# Patient Record
Sex: MALE | Race: ASIAN | URBAN - METROPOLITAN AREA
[De-identification: names, ages, dates, MRNs, and addresses within clinical notes are randomized per-mention and may not be internally consistent; named-entity substitution may affect disease eponyms.]

---

## 2018-03-15 ENCOUNTER — OFFICE VISIT (OUTPATIENT)
Dept: FAMILY MEDICINE CLINIC | Age: 33
End: 2018-03-15

## 2018-03-15 VITALS
HEIGHT: 67 IN | TEMPERATURE: 99.5 F | RESPIRATION RATE: 16 BRPM | OXYGEN SATURATION: 97 % | SYSTOLIC BLOOD PRESSURE: 110 MMHG | HEART RATE: 83 BPM | WEIGHT: 156 LBS | BODY MASS INDEX: 24.48 KG/M2 | DIASTOLIC BLOOD PRESSURE: 70 MMHG

## 2018-03-15 DIAGNOSIS — M62.838 TRAPEZIUS MUSCLE SPASM: ICD-10-CM

## 2018-03-15 DIAGNOSIS — Z00.00 ROUTINE GENERAL MEDICAL EXAMINATION AT A HEALTH CARE FACILITY: Primary | ICD-10-CM

## 2018-03-15 PROBLEM — S46.811A STRAIN OF RIGHT TRAPEZIUS MUSCLE: Status: ACTIVE | Noted: 2018-03-15

## 2018-03-15 RX ORDER — CYCLOBENZAPRINE HCL 5 MG
5 TABLET ORAL
Qty: 15 TAB | Refills: 0 | Status: SHIPPED | OUTPATIENT
Start: 2018-03-15 | End: 2018-10-16 | Stop reason: ALTCHOICE

## 2018-03-15 RX ORDER — DICLOFENAC SODIUM 100 MG/1
100 TABLET, FILM COATED, EXTENDED RELEASE ORAL
Qty: 30 TAB | Refills: 0 | Status: SHIPPED | OUTPATIENT
Start: 2018-03-15 | End: 2018-10-16 | Stop reason: ALTCHOICE

## 2018-03-15 NOTE — PROGRESS NOTES
Chief Complaint   Patient presents with    Complete Physical     New Patient.       Neck Pain     pain radiates to upper back and upper base of head     Other     planning for conceive

## 2018-03-15 NOTE — PROGRESS NOTES
HISTORY OF PRESENT ILLNESS  Haim Gipson is a 29 y.o. male. He is a new patient and is here to establish care. Has recently moved from [facility]. .  The following sections were reviewed & updated as appropriate: PMH, PL, PSH, and SH. Had complete wellness completed at [facility] in 12/2017    HPI  Health Maintenance  Immunizations:    Influenza: up to date. Tetanus: unknown, record requested. Gardasil: n/a. Cancer screening:    Reviewed testicular, prostate, and colon cancer screening guidelines. Neck Pain  Patient complains of neck pain. Onset of symptoms was several months ago, unchanged since that time. Current symptoms are pain in right side neck (aching, throbbing, tight band in character; 5/10 in severity), stiffness in neck and upper back, denies weakness, denies numbness, denies paresthesias. Patient denies numbness, tingling, paresthesias in upper extremities. Patient denies weakness, diminished  strength, lack of coordination. Radiation of pain: to scalp and upper back Event that precipitate these symptoms: longstanding problem which has been getting worse, he used to ride bike and carry big back pack. Patient has had recurrent self limited episodes of neck pain in the past.  Previous treatments include: chiropractor/manipulation, ortho consult and cervical belt      Patient Care Team:  Harlan Nicolas MD as PCP - General (Family Practice)       The following sections were reviewed & updated as appropriate: PMH, PSH, FH, and SH. Patient Active Problem List   Diagnosis Code    Routine general medical examination at a health care facility Z00.00    Trapezius muscle spasm M62.838          Prior to Admission medications    Medication Sig Start Date End Date Taking? Authorizing Provider   cyclobenzaprine (FLEXERIL) 5 mg tablet Take 1 Tab by mouth nightly. 3/15/18  Yes Harlan Nicolas MD   diclofenac (VOLTAREN XR) 100 mg ER tablet Take 1 Tab by mouth nightly.  3/15/18  Yes Harlan Nicolas MD          No Known Allergies     Review of Systems   Constitutional: Negative for chills, fever and malaise/fatigue. HENT: Negative for congestion, ear pain, sore throat and tinnitus. Eyes: Negative for blurred vision, double vision, pain and discharge. Respiratory: Negative for cough, shortness of breath and wheezing. Cardiovascular: Negative for chest pain, palpitations and leg swelling. Gastrointestinal: Negative for abdominal pain, blood in stool, constipation, diarrhea, nausea and vomiting. Genitourinary: Negative for dysuria, frequency, hematuria and urgency. Musculoskeletal: Positive for neck pain. Negative for back pain, joint pain and myalgias. Skin: Negative for rash. Neurological: Negative for dizziness, tremors, seizures and headaches. Endo/Heme/Allergies: Negative for polydipsia. Does not bruise/bleed easily. Psychiatric/Behavioral: Negative for depression and substance abuse. The patient is not nervous/anxious. Physical Exam   Constitutional: He is oriented to person, place, and time. He appears well-developed and well-nourished. HENT:   Head: Normocephalic and atraumatic. Right Ear: External ear normal.   Left Ear: External ear normal.   Nose: Nose normal.   Mouth/Throat: Oropharynx is clear and moist. No oropharyngeal exudate. Eyes: Conjunctivae and EOM are normal. Pupils are equal, round, and reactive to light. Neck: Trachea normal and normal range of motion. Neck supple. Muscular tenderness present. No spinous process tenderness present. No thyromegaly present. Cardiovascular: Normal rate, regular rhythm, normal heart sounds and intact distal pulses. No murmur heard. Pulmonary/Chest: Effort normal and breath sounds normal. No respiratory distress. He has no wheezes. Abdominal: Soft. Bowel sounds are normal. He exhibits no distension and no mass. Genitourinary: Testes normal and penis normal. Right testis shows no mass.  Left testis shows no mass.   Musculoskeletal: Normal range of motion. He exhibits no edema or tenderness. Neurological: He is alert and oriented to person, place, and time. He has normal strength and normal reflexes. No sensory deficit. Cranial nerves 2-12 normal   Skin: Skin is warm and dry. No rash noted. Psychiatric: He has a normal mood and affect. His behavior is normal. Thought content normal.   Nursing note and vitals reviewed. ASSESSMENT and PLAN  Diagnoses and all orders for this visit:    1. Routine general medical examination at a health care facility  -     CBC WITH AUTOMATED DIFF  -     METABOLIC PANEL, COMPREHENSIVE  -     TSH REFLEX TO T4  -     URINALYSIS W/ RFLX MICROSCOPIC    2. Trapezius muscle spasm  -     cyclobenzaprine (FLEXERIL) 5 mg tablet; Take 1 Tab by mouth nightly. -     diclofenac (VOLTAREN XR) 100 mg ER tablet; Take 1 Tab by mouth nightly. discussed home exercise, muscle relaxer and as needed NSAID. Trigger injection if no improvement  Discussed lifestyle issues and health guidance given  Patient was given an after visit summary which includes diagnoses, vital signs, current medications, instructions and references & authorized prescriptions . Results of labs will be conveyed to patient, once available. Pt verbalized instructions I provided and expressed understanding of discussion that was held today. Follow-up Disposition:  Return if symptoms worsen or fail to improve.

## 2018-03-15 NOTE — MR AVS SNAPSHOT
[address] [place] [address] 
[phone] Patient: Cristiana Faith 
MRN: [id number] MSR:[id number]/[id number] Visit Information Date & Time Provider Department Dept. Phone Encounter #  
 3/15/2018 11:00 AM Jessika Perez, [address] [place] [address] [phone] [id number] Follow-up Instructions Return if symptoms worsen or fail to improve. Upcoming Health Maintenance Date Due DTaP/Tdap/Td series (1 - Tdap) 9/11/2008 Allergies as of 3/15/2018  Review Complete On: 3/15/2018 By: Jessika Perez MD  
 No Known Allergies Current Immunizations  Never Reviewed No immunizations on file. Not reviewed this visit You Were Diagnosed With   
  
 Codes Comments Routine general medical examination at a health care facility    -  Primary ICD-10-CM: Z00.00 ICD-9-CM: V70.0 Trapezius muscle spasm     ICD-10-CM: J61.886 ICD-9-CM: 728.85 Vitals BP Pulse Temp Resp Height(growth percentile) Weight(growth percentile) 110/70 (BP 1 Location: Right arm, BP Patient Position: Sitting) 83 99.5 °F (37.5 °C) (Oral) 16 5' 7.32\" (1.71 m) 156 lb (70.8 kg) SpO2 BMI Smoking Status 97% 24.2 kg/m2 Never Smoker Vitals History BMI and BSA Data Body Mass Index Body Surface Area  
 24.2 kg/m 2 1.83 m 2 Preferred Pharmacy Pharmacy Name Phone [id number] [place], [place] - [address] [phone] Your Updated Medication List  
  
   
This list is accurate as of 3/15/18 12:02 PM.  Always use your most recent med list.  
  
  
  
  
 cyclobenzaprine 5 mg tablet Commonly known as:  FLEXERIL Take 1 Tab by mouth nightly. diclofenac 100 mg ER tablet Commonly known as:  VOLTAREN XR Take 1 Tab by mouth nightly. Prescriptions Sent to Pharmacy  Refills  
 cyclobenzaprine (FLEXERIL) 5 mg tablet 0  
 Sig: Take 1 Tab by mouth nightly. Class: Normal  
 [facility] [phone] Route: Oral  
 diclofenac (VOLTAREN XR) 100 mg ER tablet 0 Sig: Take 1 Tab by mouth nightly. Class: Normal  
 [facility]: [phone] Route: Oral  
  
We Performed the Following CBC WITH AUTOMATED DIFF [87957 CPT(R)] METABOLIC PANEL, COMPREHENSIVE [81801 CPT(R)] TSH REFLEX TO T4 [12357 CPT(R)] URINALYSIS W/ RFLX MICROSCOPIC [74808 CPT(R)] Follow-up Instructions Return if symptoms worsen or fail to improve. Patient Instructions Neck Spasm: Exercises Your Care Instructions Here are some examples of typical rehabilitation exercises for your condition. Start each exercise slowly. Ease off the exercise if you start to have pain. Your doctor or physical therapist will tell you when you can start these exercises and which ones will work best for you. How to do the exercises Levator scapula stretch 1. Sit in a firm chair, or stand up straight. 2. Gently tilt your head toward your left shoulder. 3. Turn your head to look down into your armpit, bending your head slightly forward. Let the weight of your head stretch your neck muscles. 4. Hold for 15 to 30 seconds. 5. Return to your starting position. 6. Follow the same instructions above, but tilt your head toward your right shoulder. 7. Repeat 2 to 4 times toward each shoulder. Upper trapezius stretch 1. Sit in a firm chair, or stand up straight. 2. This stretch works best if you keep your shoulder down as you lean away from it. To help you remember to do this, start by relaxing your shoulders and lightly holding on to your thighs or your chair. 3. Tilt your head toward your shoulder and hold for 15 to 30 seconds.  Let the weight of your head stretch your muscles. 4. If you would like a little added stretch, place your arm behind your back. Use the arm opposite of the direction you are tilting your head. For example, if you are tilting your head to the left, place your right arm behind your back. 5. Repeat 2 to 4 times toward each shoulder. Neck rotation 1. Sit in a firm chair, or stand up straight. 2. Keeping your chin level, turn your head to the right, and hold for 15 to 30 seconds. 3. Turn your head to the left, and hold for 15 to 30 seconds. 4. Repeat 2 to 4 times to each side. Chin tuck 1. Lie on the floor with a rolled-up towel under your neck. Your head should be touching the floor. 2. Slowly bring your chin toward the front of your neck. 3. Hold for a count of 6, and then relax for up to 10 seconds. 4. Repeat 8 to 12 times. Forward neck flexion 1. Sit in a firm chair, or stand up straight. 2. Bend your head forward. 3. Hold for 15 to 30 seconds, then return to your starting position. 4. Repeat 2 to 4 times. Follow-up care is a key part of your treatment and safety. Be sure to make and go to all appointments, and call your doctor if you are having problems. It's also a good idea to know your test results and keep a list of the medicines you take. Where can you learn more? Go to http://abdon-jn.info/. Enter P962 in the search box to learn more about \"Neck Spasm: Exercises. \" Current as of: March 21, 2017 Content Version: 11.4 © 6094-0575 Healthwise, Incorporated. Care instructions adapted under license by [employer] (which disclaims liability or warranty for this information). If you have questions about a medical condition or this instruction, always ask your healthcare professional. [place] [address] any warranty or liability for your use of this information. Introducing [facility] SERVICES! Samara Lacey introduces [facility] patient portal. Now you can access parts of your medical record, email your doctor's office, and request medication refills online. 1. In your internet browser, go to [email or web address] 2. Click on the First Time User? Click Here link in the Sign In box. You will see the New Member Sign Up page. 3. Enter your [facility] Access Code exactly as it appears below. You will not need to use this code after youve completed the sign-up process. If you do not sign up before the expiration date, you must request a new code. · [facility] Access Code: [email or web address] Expires: 6/13/2018 12:02 PM 
 
4. Enter the last four digits of your Social Security Number (xxxx) and Date of Birth (mm/dd/yyyy) as indicated and click Submit. You will be taken to the next sign-up page. 5. Create a [facility] ID. This will be your [facility] login ID and cannot be changed, so think of one that is secure and easy to remember. 6. Create a [facility] password. You can change your password at any time. 7. Enter your Password Reset Question and Answer. This can be used at a later time if you forget your password. 8. Enter your e-mail address. You will receive e-mail notification when new information is available in [address] 9. Click Sign Up. You can now view and download portions of your medical record. 10. Click the Download Summary menu link to download a portable copy of your medical information. If you have questions, please visit the Frequently Asked Questions section of the [facility] website. Remember, [facility] is NOT to be used for urgent needs. For medical emergencies, dial 911. Now available from your iPhone and Android! Please provide this summary of care documentation to your next provider. Your primary care clinician is listed as Marcelo Sever. If you have any questions after today's visit, please call [phone].

## 2018-03-15 NOTE — PATIENT INSTRUCTIONS
Neck Spasm: Exercises  Your Care Instructions  Here are some examples of typical rehabilitation exercises for your condition. Start each exercise slowly. Ease off the exercise if you start to have pain. Your doctor or physical therapist will tell you when you can start these exercises and which ones will work best for you. How to do the exercises  Levator scapula stretch    1. Sit in a firm chair, or stand up straight. 2. Gently tilt your head toward your left shoulder. 3. Turn your head to look down into your armpit, bending your head slightly forward. Let the weight of your head stretch your neck muscles. 4. Hold for 15 to 30 seconds. 5. Return to your starting position. 6. Follow the same instructions above, but tilt your head toward your right shoulder. 7. Repeat 2 to 4 times toward each shoulder. Upper trapezius stretch    1. Sit in a firm chair, or stand up straight. 2. This stretch works best if you keep your shoulder down as you lean away from it. To help you remember to do this, start by relaxing your shoulders and lightly holding on to your thighs or your chair. 3. Tilt your head toward your shoulder and hold for 15 to 30 seconds. Let the weight of your head stretch your muscles. 4. If you would like a little added stretch, place your arm behind your back. Use the arm opposite of the direction you are tilting your head. For example, if you are tilting your head to the left, place your right arm behind your back. 5. Repeat 2 to 4 times toward each shoulder. Neck rotation    1. Sit in a firm chair, or stand up straight. 2. Keeping your chin level, turn your head to the right, and hold for 15 to 30 seconds. 3. Turn your head to the left, and hold for 15 to 30 seconds. 4. Repeat 2 to 4 times to each side. Chin tuck    1. Lie on the floor with a rolled-up towel under your neck. Your head should be touching the floor. 2. Slowly bring your chin toward the front of your neck.   3. Hold for a count of 6, and then relax for up to 10 seconds. 4. Repeat 8 to 12 times. Forward neck flexion    1. Sit in a firm chair, or stand up straight. 2. Bend your head forward. 3. Hold for 15 to 30 seconds, then return to your starting position. 4. Repeat 2 to 4 times. Follow-up care is a key part of your treatment and safety. Be sure to make and go to all appointments, and call your doctor if you are having problems. It's also a good idea to know your test results and keep a list of the medicines you take. Where can you learn more? Go to http://abdon-jn.info/. Enter P962 in the search box to learn more about \"Neck Spasm: Exercises. \"  Current as of: March 21, 2017  Content Version: 11.4  © 0263-7831 Healthwise, Incorporated. Care instructions adapted under license by [employer] (which disclaims liability or warranty for this information). If you have questions about a medical condition or this instruction, always ask your healthcare professional. [place] [address] any warranty or liability for your use of this information.

## 2018-03-16 LAB
ALBUMIN SERPL-MCNC: 4.6 G/DL (ref 3.5–5.5)
ALBUMIN/GLOB SERPL: 1.6 {RATIO} (ref 1.2–2.2)
ALP SERPL-CCNC: 73 IU/L (ref 39–117)
ALT SERPL-CCNC: 71 IU/L (ref 0–44)
APPEARANCE UR: CLEAR
AST SERPL-CCNC: 45 IU/L (ref 0–40)
BASOPHILS # BLD AUTO: 0.1 X10E3/UL (ref 0–0.2)
BASOPHILS NFR BLD AUTO: 1 %
BILIRUB SERPL-MCNC: 1.3 MG/DL (ref 0–1.2)
BILIRUB UR QL STRIP: NEGATIVE
BUN SERPL-MCNC: 11 MG/DL (ref 6–20)
BUN/CREAT SERPL: 9 (ref 9–20)
CALCIUM SERPL-MCNC: 9.7 MG/DL (ref 8.7–10.2)
CHLORIDE SERPL-SCNC: 100 MMOL/L (ref 96–106)
CO2 SERPL-SCNC: 24 MMOL/L (ref 18–29)
COLOR UR: YELLOW
CREAT SERPL-MCNC: 1.16 MG/DL (ref 0.76–1.27)
EOSINOPHIL # BLD AUTO: 0.5 X10E3/UL (ref 0–0.4)
EOSINOPHIL NFR BLD AUTO: 6 %
ERYTHROCYTE [DISTWIDTH] IN BLOOD BY AUTOMATED COUNT: 13.7 % (ref 12.3–15.4)
GFR SERPLBLD CREATININE-BSD FMLA CKD-EPI: 84 ML/MIN/1.73
GFR SERPLBLD CREATININE-BSD FMLA CKD-EPI: 97 ML/MIN/1.73
GLOBULIN SER CALC-MCNC: 2.8 G/DL (ref 1.5–4.5)
GLUCOSE SERPL-MCNC: 69 MG/DL (ref 65–99)
GLUCOSE UR QL: NEGATIVE
HCT VFR BLD AUTO: 50.3 % (ref 37.5–51)
HGB BLD-MCNC: 17 G/DL (ref 13–17.7)
HGB UR QL STRIP: NEGATIVE
IMM GRANULOCYTES # BLD: 0 X10E3/UL (ref 0–0.1)
IMM GRANULOCYTES NFR BLD: 0 %
KETONES UR QL STRIP: NEGATIVE
LEUKOCYTE ESTERASE UR QL STRIP: NEGATIVE
LYMPHOCYTES # BLD AUTO: 2.4 X10E3/UL (ref 0.7–3.1)
LYMPHOCYTES NFR BLD AUTO: 32 %
MCH RBC QN AUTO: 33.1 PG (ref 26.6–33)
MCHC RBC AUTO-ENTMCNC: 33.8 G/DL (ref 31.5–35.7)
MCV RBC AUTO: 98 FL (ref 79–97)
MICRO URNS: NORMAL
MONOCYTES # BLD AUTO: 0.8 X10E3/UL (ref 0.1–0.9)
MONOCYTES NFR BLD AUTO: 10 %
NEUTROPHILS # BLD AUTO: 3.9 X10E3/UL (ref 1.4–7)
NEUTROPHILS NFR BLD AUTO: 51 %
NITRITE UR QL STRIP: NEGATIVE
PH UR STRIP: 6.5 [PH] (ref 5–7.5)
PLATELET # BLD AUTO: 262 X10E3/UL (ref 150–379)
POTASSIUM SERPL-SCNC: 4.5 MMOL/L (ref 3.5–5.2)
PROT SERPL-MCNC: 7.4 G/DL (ref 6–8.5)
PROT UR QL STRIP: NEGATIVE
RBC # BLD AUTO: 5.13 X10E6/UL (ref 4.14–5.8)
SODIUM SERPL-SCNC: 142 MMOL/L (ref 134–144)
SP GR UR: 1.01 (ref 1–1.03)
TSH SERPL DL<=0.005 MIU/L-ACNC: 1.44 UIU/ML (ref 0.45–4.5)
UROBILINOGEN UR STRIP-MCNC: 0.2 MG/DL (ref 0.2–1)
WBC # BLD AUTO: 7.6 X10E3/UL (ref 3.4–10.8)

## 2018-03-16 NOTE — PROGRESS NOTES
Adarsh Antonio,  I have reviewed your results  Blood count again shows borderline elevated MCV and borderline elevated bilirubin and liver enzymes. Thyroid function normal  [facility] Can you please varify with them if they have checked for Hepatitis B and A also which is more common in [facility] . If not we will just get it checked. Send me message once you confirm.   thanks

## 2018-03-19 ENCOUNTER — PATIENT MESSAGE (OUTPATIENT)
Dept: FAMILY MEDICINE CLINIC | Age: 33
End: 2018-03-19

## 2018-03-19 DIAGNOSIS — R79.89 ABNORMAL LFTS: Primary | ICD-10-CM

## 2018-03-19 NOTE — TELEPHONE ENCOUNTER
From: Katya Mention  To: Felipe Burks MD  Sent: 3/19/2018 8:04 AM EDT  Subject: Visit Follow-Up Question    Hello Doctor,    Hepatitis A and B test were not done during the annual check up which I had in December at [facility]. I am ready to have this test done.      Thanks,  Todd Bowers

## 2018-03-20 LAB
EST. AVERAGE GLUCOSE BLD GHB EST-MCNC: 103 MG/DL
HAV IGM SERPL QL IA: NEGATIVE
HBA1C MFR BLD: 5.2 % (ref 4.8–5.6)
HBV SURFACE AB SER QL: REACTIVE

## 2018-03-20 NOTE — TELEPHONE ENCOUNTER
Adarsh Antonio,  Your hepatitis A and B results are also normal, reassuring. So likely elevation in bilirubin and liver enzymes is benign, not to worry.   thanks

## 2018-04-02 ENCOUNTER — PATIENT MESSAGE (OUTPATIENT)
Dept: FAMILY MEDICINE CLINIC | Age: 33
End: 2018-04-02

## 2018-04-02 DIAGNOSIS — M54.2 NECK PAIN: ICD-10-CM

## 2018-04-02 DIAGNOSIS — M62.838 TRAPEZIUS MUSCLE SPASM: Primary | ICD-10-CM

## 2018-04-18 NOTE — TELEPHONE ENCOUNTER
From: [facility]  To: oYni Coy MD  Sent: 4/2/2018 11:37 AM EDT  Subject: Prescription Question    Hello Doctor,    For the shoulder pain I was taking 2 medicine, one was completed last night and another still available for 10 days. Should I be continuing only 1 for that 10 days?     Regards  [place]

## 2018-10-16 ENCOUNTER — OFFICE VISIT (OUTPATIENT)
Dept: FAMILY MEDICINE CLINIC | Age: 33
End: 2018-10-16

## 2018-10-16 VITALS
BODY MASS INDEX: 26.21 KG/M2 | HEIGHT: 67 IN | DIASTOLIC BLOOD PRESSURE: 76 MMHG | HEART RATE: 77 BPM | WEIGHT: 167 LBS | SYSTOLIC BLOOD PRESSURE: 120 MMHG | OXYGEN SATURATION: 98 % | TEMPERATURE: 98.4 F | RESPIRATION RATE: 16 BRPM

## 2018-10-16 DIAGNOSIS — E66.3 OVER WEIGHT: ICD-10-CM

## 2018-10-16 DIAGNOSIS — Z23 ENCOUNTER FOR IMMUNIZATION: ICD-10-CM
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
## 2018-10-16 DIAGNOSIS — L28.1 PRURIGO NODULARIS: Primary | ICD-10-CM

## 2018-10-16 DIAGNOSIS — M62.838 TRAPEZIUS MUSCLE SPASM: ICD-10-CM

## 2018-10-16 RX ORDER — CLOBETASOL PROPIONATE 0.5 MG/G
CREAM TOPICAL 2 TIMES DAILY
Qty: 15 G | Refills: 0 | Status: SHIPPED | OUTPATIENT
Start: 2018-10-16

## 2018-10-16 NOTE — PROGRESS NOTES
HISTORY OF PRESENT ILLNESS Clarisse Auguste is a 34 y.o. male. he was seen for lesion on dorsum of right hand and also persistent neck spasm. HPI Lump: 
Clarisse Auguste is a 34 y.o. male who was seen today for evaluation of a  skin lesion located on right hand. The patient reports that the mass has been present for 2 months. The onset of the mass was gradual.  
Associated symptoms include itching. Patient denies pain, redness or discoloration of the skin, fatigue, weight loss, fevers. Prior treatments, if any: none He went for hiking about 2 months back. Had some insect bite and had severe itching. Slowly that area converted into nodule. Neck Pain Patient complains of neck pain. Onset of symptoms was several months ago, unchanged since that time. Current symptoms are pain in right side neck (aching, throbbing, tight band in character; 5/10 in severity), stiffness in neck and upper back, denies weakness, denies numbness, denies paresthesias. Patient denies numbness, tingling, paresthesias in upper extremities. Patient denies weakness, diminished  strength, lack of coordination. Radiation of pain: to scalp and upper back Event that precipitate these symptoms: longstanding problem which has been getting worse, he used to ride bike and carry big back pack. Patient has had recurrent self limited episodes of neck pain in the past.  Previous treatments include: chiropractor/manipulation, ortho consult and cervical belt, muscle relaxer and NSAID Review of Systems Constitutional: Negative for chills, fever and malaise/fatigue. HENT: Negative for congestion, ear pain, sore throat and tinnitus. Eyes: Negative for blurred vision, double vision, pain and discharge. Respiratory: Negative for cough, shortness of breath and wheezing. Cardiovascular: Negative for chest pain, palpitations and leg swelling.   
Gastrointestinal: Negative for abdominal pain, blood in stool, constipation, diarrhea, nausea and vomiting. Genitourinary: Negative for dysuria, frequency, hematuria and urgency. Musculoskeletal: Positive for neck pain. Negative for back pain, joint pain and myalgias. Skin: Negative for rash. Lesion on dorsum of right hand Neurological: Negative for dizziness, tremors, seizures and headaches. Endo/Heme/Allergies: Negative for polydipsia. Does not bruise/bleed easily. Psychiatric/Behavioral: Negative for depression and substance abuse. The patient is not nervous/anxious. Physical Exam  
Constitutional: He is oriented to person, place, and time. He appears well-developed and well-nourished. HENT:  
Head: Normocephalic and atraumatic. Right Ear: External ear normal.  
Left Ear: External ear normal.  
Nose: Nose normal.  
Mouth/Throat: Oropharynx is clear and moist. No oropharyngeal exudate. Eyes: Conjunctivae and EOM are normal. Pupils are equal, round, and reactive to light. Neck: Trachea normal and normal range of motion. Neck supple. Muscular tenderness present. No spinous process tenderness present. No thyromegaly present. Cardiovascular: Normal rate, regular rhythm, normal heart sounds and intact distal pulses. No murmur heard. Pulmonary/Chest: Effort normal and breath sounds normal. No respiratory distress. He has no wheezes. Abdominal: Soft. Bowel sounds are normal. He exhibits no distension and no mass. Genitourinary: Penis normal.  
Musculoskeletal: Normal range of motion. He exhibits no edema or tenderness. Hands: 
Neurological: He is alert and oriented to person, place, and time. He has normal strength and normal reflexes. No sensory deficit. Cranial nerves 2-12 normal  
Skin: Skin is warm and dry. No rash noted. Psychiatric: He has a normal mood and affect. His behavior is normal. Thought content normal.  
Nursing note and vitals reviewed. ASSESSMENT and PLAN Diagnoses and all orders for this visit: 
 
 1. Prurigo nodularis 
-     clobetasol (TEMOVATE) 0.05 % topical cream; Apply  to affected area two (2) times a day. 2. Trapezius muscle spasm 
-     REFERRAL TO PHYSICAL THERAPY 3. Encounter for immunization -     [place] IMMUNIZ ADMIN,1 SINGLE/COMB VAC/TOXOID 
-     Influenza virus vaccine (QUADRIVALENT PRES FREE SYRINGE) IM (23305) 4. Over weight Discussed abnormal weight, ideal BMI and importance of diet and exercise to loose weight. Referral for exercise program was offered. Printed information about diet and lifestyle modification provided. 5. Tick bite, initial encounter 
-     LYME AB TOTAL W/RFLX W BLOT Discussed lifestyle issues and health guidance given Patient was given an after visit summary which includes diagnoses, vital signs, current medications, instructions and references & authorized prescriptions . Results of labs will be conveyed to patient, once available. Pt verbalized instructions I provided and expressed understanding of discussion that was held today. Follow-up Disposition: Not on File

## 2018-10-16 NOTE — PROGRESS NOTES
Chief Complaint Patient presents with  
 Skin Problem  
  right hand, darken area that has been on hand for two months, tender to touch . originally red with itching  Immunization/Injection Influenza 1. Have you been to the ER, urgent care clinic since your last visit? Hospitalized since your last visit? No 
 
2. Have you seen or consulted any other health care providers outside of the [address] [place] [address] since your last visit? Include any pap smears or colon screening. Katherine Medina  is a 34 y.o.  male  who present for Influenza  immunizations/injections. He/she denies any symptoms , reactions or allergies that would exclude them from being immunized today. Risks and adverse reactions were discussed and the VIS was given if applicable to them. All questions were addressed. He/She was observed for 10 min post injection. There were no reactions observed.  
 
Staci Vega LPN

## 2018-10-16 NOTE — MR AVS SNAPSHOT
[address] 
[phone] Patient: Kelvin Arevalo 
MRN: [id number] XDS:0/52/3403 Visit Information Date & Time Provider Department Dept. Phone Encounter #  
 10/16/2018  1:40 PM Teresita Vergara, [address] [place] [address] [phone] [id number] Upcoming Health Maintenance Date Due DTaP/Tdap/Td series (1 - Tdap) 9/11/2008 Influenza Age 5 to Adult 8/1/2018 Allergies as of 10/16/2018  Review Complete On: 10/16/2018 By: Teresita Veragra MD  
 No Known Allergies Current Immunizations  Never Reviewed Name Date Influenza Vaccine (Quad) PF  Incomplete Not reviewed this visit You Were Diagnosed With   
  
 Codes Comments Prurigo nodularis    -  Primary ICD-10-CM: L28.1 ICD-9-CM: 698.3 Trapezius muscle spasm     ICD-10-CM: C38.067 ICD-9-CM: 728.85 Encounter for immunization     ICD-10-CM: Q11 ICD-9-CM: V03.89 Over weight     ICD-10-CM: B38.2 ICD-9-CM: 278.02 Tick bite, initial encounter     ICD-10-CM: W57. Carbon Fines ICD-9-CM: 919.4, E906.4 Vitals BP Pulse Temp Resp Height(growth percentile) Weight(growth percentile) 120/76 (BP 1 Location: Left arm, BP Patient Position: Sitting) 77 98.4 °F (36.9 °C) (Oral) 16 5' 7.32\" (1.71 m) 167 lb (75.8 kg) SpO2 BMI Smoking Status 98% 25.91 kg/m2 Never Smoker Vitals History BMI and BSA Data Body Mass Index Body Surface Area  
 25.91 kg/m 2 1.9 m 2 Preferred Pharmacy Pharmacy Name Phone [id number] [place], [place] - [address] [phone] Your Updated Medication List  
  
   
This list is accurate as of 10/16/18  2:17 PM.  Always use your most recent med list.  
  
  
  
  
 clobetasol 0.05 % topical cream  
Commonly known as:  Luis Pattersonenstein Apply  to affected area two (2) times a day. Prescriptions Sent to Pharmacy Refills  
 clobetasol (TEMOVATE) 0.05 % topical cream 0 Sig: Apply  to affected area two (2) times a day. Class: Normal  
 Pharmacy: [place], [address]  #: [phone] Route: Topical  
  
We Performed the Following INFLUENZA VIRUS VAC QUAD,SPLIT,PRESV FREE SYRINGE IM [id number] CPT(R)] LYME AB TOTAL W/RFLX W BLOT [[id number] Custom] UT IMMUNIZ ADMIN,1 SINGLE/COMB VAC/TOXOID [id number] CPT(R)] REFERRAL TO PHYSICAL THERAPY [RSW44 Custom] Comments:  
 Please evaluate patient for neck spasm. Referral Information Referral ID Referred By Referred To  
  
 [id number] Adrian Fabian Not Available Visits Status Start Date End Date 1 New Request 10/16/18 10/16/19 If your referral has a status of pending review or denied, additional information will be sent to support the outcome of this decision. Patient Instructions When You Are Overweight: Care Instructions Your Care Instructions If you're overweight, your doctor may recommend that you make changes in your eating and exercise habits. Being overweight can lead to serious health problems, such as high blood pressure, heart disease, type 2 diabetes, and arthritis, or it can make these problems worse. Eating a healthy diet and being more active can help you reach and stay at a healthy weight. You don't have to make huge changes all at once. Start by making small changes in your eating and exercise habits. To lose weight, you need to burn more calories than you take in. You can do this by eating healthy foods in reasonable amounts and becoming more active every day. Follow-up care is a key part of your treatment and safety. Be sure to make and go to all appointments, and call your doctor if you are having problems. It's also a good idea to know your test results and keep a list of the medicines you take. How can you care for yourself at home? · Improve your eating habits. You'll be more successful if you work on changing one eating habit at a time. All foods, if eaten in moderation, can be part of healthy eating. Remember to: 
[address] a variety of foods from each food group. Include grains, vegetables, fruits, dairy, and protein foods. ¨ Limit foods high in fat, sugar, and calories. ¨ Eat slowly. And don't do anything else, such as watch TV, while you are eating. ¨ Pay attention to portion sizes. Put your food on a smaller plate. ¨ Plan your meals ahead of time. You'll be less likely to grab something that's not as healthy. · Get active. Regular activity can help you feel better, have more energy, and burn more calories. If you haven't been active, start slowly. Start with at least 30 minutes of moderate activity on most days of the week. Then gradually increase the amount of activity. Try for 60 or 90 minutes a day, at least 5 days a week. There are a lot of ways to fit activity into your life. You can: 
¨ Walk or bike to the store. Or walk with a friend, or walk the dog. ¨ Mow the lawn, rake leaves, shovel snow, or do some gardening. ¨ Use the stairs instead of the elevator, at least for a few floors. · Change your thinking. Your thoughts have a lot to do with how you feel and what you do. When you're trying to reach a healthy weight, changing how you think about certain things may help. Here are some ideas: ¨ Don't compare yourself to others. Healthy bodies come in all shapes and sizes. ¨ Pay attention to how hungry or full you feel. When you eat, be aware of why you're eating and how much you're eating. ¨ Focus on improving your health instead of dieting. Dieting almost never works over the long term. · Ask your doctor about other health professionals who can help you reach a healthy weight. ¨ A dietitian can help you make healthy changes in your diet. ¨ An exercise specialist or  can help you develop a safe and effective exercise program. 
¨ A counselor or psychiatrist can help you cope with issues such as depression, anxiety, or family problems that can make it hard to focus on reaching a healthy weight. · Get support from your family, your doctor, your friends, a support groupand support yourself. Where can you learn more? Go to http://abdon-jn.info/. Enter S391 in the search box to learn more about \"When You Are Overweight: Care Instructions. \" Current as of: September 10, 2017 Content Version: 11.8 © 0034-0279 [employer]. Care instructions adapted under license by [employer] (which disclaims liability or warranty for this information). If you have questions about a medical condition or this instruction, always ask your healthcare professional. [address] any warranty or liability for your use of this information. Introducing [facility]! Dear Arlet Martinez: Thank you for requesting a [employer] account. Our records indicate that you already have an active [employer] account. You can access your account anytime at [email or web address] Did you know that you can access your hospital and ER discharge instructions at any time in [employer]? You can also review all of your test results from your hospital stay or ER visit. Additional Information If you have questions, please visit the Frequently Asked Questions section of the [employer] website at [email or web address] Remember, [employer] is NOT to be used for urgent needs. For medical emergencies, dial 911. Now available from your iPhone and Android! Please provide this summary of care documentation to your next provider. Lyme Disease Testing Disclaimer:   
 § 75.3-0842.2. (Expires July 1, 2018) Lyme disease testing information disclosure. A. Every licensee or his in-office designee who orders a laboratory test for the presence of Lyme disease shall provide to the patient or his legal representative the following written information: \"ACCORDING TO THE CENTERS FOR DISEASE CONTROL AND PREVENTION, AS OF 2011 LYME DISEASE IS THE SIXTH FASTEST GROWING DISEASE IN THE [place]. YOUR HEALTH CARE PROVIDER HAS ORDERED A LABORATORY TEST FOR THE PRESENCE OF LYME DISEASE FOR YOU. CURRENT LABORATORY TESTING FOR LYME DISEASE CAN BE PROBLEMATIC AND STANDARD LABORATORY TESTS OFTEN RESULT IN FALSE NEGATIVE AND FALSE POSITIVE RESULTS, AND IF DONE TOO EARLY, YOU MAY NOT HAVE PRODUCED ENOUGH ANTIBODIES TO BE CONSIDERED POSITIVE BECAUSE YOUR IMMUNE RESPONSE REQUIRES TIME TO DEVELOP ANTIBODIES. IF YOU ARE TESTED FOR LYME DISEASE, AND THE RESULTS ARE NEGATIVE, THIS DOES NOT NECESSARILY MEAN YOU DO NOT HAVE LYME DISEASE. IF YOU CONTINUE TO EXPERIENCE SYMPTOMS, YOU SHOULD CONTACT YOUR HEALTH CARE PROVIDER AND INQUIRE ABOUT THE APPROPRIATENESS OF RETESTING OR ADDITIONAL TREATMENT. \"  
B. Licensees shall be immune from civil liability for the provision of the written information required by this section absent gross negligence or willful misconduct. Your primary care clinician is listed as Shahab Galvin. If you have any questions after today's visit, please call [phone].

## 2018-10-16 NOTE — PATIENT INSTRUCTIONS
When You Are Overweight: Care Instructions Your Care Instructions If you're overweight, your doctor may recommend that you make changes in your eating and exercise habits. Being overweight can lead to serious health problems, such as high blood pressure, heart disease, type 2 diabetes, and arthritis, or it can make these problems worse. Eating a healthy diet and being more active can help you reach and stay at a healthy weight. You don't have to make huge changes all at once. Start by making small changes in your eating and exercise habits. To lose weight, you need to burn more calories than you take in. You can do this by eating healthy foods in reasonable amounts and becoming more active every day. Follow-up care is a key part of your treatment and safety. Be sure to make and go to all appointments, and call your doctor if you are having problems. It's also a good idea to know your test results and keep a list of the medicines you take. How can you care for yourself at home? · Improve your eating habits. You'll be more successful if you work on changing one eating habit at a time. All foods, if eaten in moderation, can be part of healthy eating. Remember to: 
[address] [facility] a variety of foods from each food group. Include grains, vegetables, fruits, dairy, and protein foods. ¨ Limit foods high in fat, sugar, and calories. ¨ Eat slowly. And don't do anything else, such as watch TV, while you are eating. ¨ Pay attention to portion sizes. Put your food on a smaller plate. ¨ Plan your meals ahead of time. You'll be less likely to grab something that's not as healthy. · Get active. Regular activity can help you feel better, have more energy, and burn more calories. If you haven't been active, start slowly. Start with at least 30 minutes of moderate activity on most days of the week. Then gradually increase the amount of activity.  Try for 60 or 90 minutes a day, at least 5 days a week. There are a lot of ways to fit activity into your life. You can: 
¨ Walk or bike to the store. Or walk with a friend, or walk the dog. ¨ Mow the lawn, rake leaves, shovel snow, or do some gardening. ¨ Use the stairs instead of the elevator, at least for a few floors. · Change your thinking. Your thoughts have a lot to do with how you feel and what you do. When you're trying to reach a healthy weight, changing how you think about certain things may help. Here are some ideas: ¨ Don't compare yourself to others. Healthy bodies come in all shapes and sizes. ¨ Pay attention to how hungry or full you feel. When you eat, be aware of why you're eating and how much you're eating. ¨ Focus on improving your health instead of dieting. Dieting almost never works over the long term. · Ask your doctor about other health professionals who can help you reach a healthy weight. ¨ A dietitian can help you make healthy changes in your diet. ¨ An exercise specialist or  can help you develop a safe and effective exercise program. 
¨ A counselor or psychiatrist can help you cope with issues such as depression, anxiety, or family problems that can make it hard to focus on reaching a healthy weight. · Get support from your family, your doctor, your friends, a support groupand support yourself. Where can you learn more? Go to http://abdon-nj.info/. Enter Q508 in the search box to learn more about \"When You Are Overweight: Care Instructions. \" Current as of: September 10, 2017 Content Version: 11.8 © 1545-2522 [employer]. Care instructions adapted under license by [employer] (which disclaims liability or warranty for this information).  If you have questions about a medical condition or this instruction, always ask your healthcare professional. [address] any warranty or liability for your use of this information.

## 2018-10-17 LAB — B BURGDOR IGG+IGM SER-ACNC: <0.91 ISR (ref 0–0.9)

## 2018-10-17 NOTE — PROGRESS NOTES
Please inform patient that lyme titer is negative, means he was not exposed to tick. No need to be on medicine Thanks

## 2018-10-18 NOTE — PROGRESS NOTES
Patient sent letter of lab results as reviewed by Dr. Kodak Alegria. Testing completed was negative, no need for medication.

## 2019-09-24 PROBLEM — Z00.00 ROUTINE GENERAL MEDICAL EXAMINATION AT A HEALTH CARE FACILITY: Status: RESOLVED | Noted: 2018-03-15 | Resolved: 2019-09-24

## 2022-03-18 PROBLEM — M62.838 TRAPEZIUS MUSCLE SPASM: Status: ACTIVE | Noted: 2018-03-15

## 2023-05-18 RX ORDER — CLOBETASOL PROPIONATE 0.5 MG/G
CREAM TOPICAL 2 TIMES DAILY
COMMUNITY
Start: 2018-10-16